# Patient Record
Sex: MALE | Race: OTHER | NOT HISPANIC OR LATINO | URBAN - METROPOLITAN AREA
[De-identification: names, ages, dates, MRNs, and addresses within clinical notes are randomized per-mention and may not be internally consistent; named-entity substitution may affect disease eponyms.]

---

## 2024-02-18 ENCOUNTER — EMERGENCY (EMERGENCY)
Facility: HOSPITAL | Age: 34
LOS: 1 days | Discharge: HOME | End: 2024-02-18
Attending: EMERGENCY MEDICINE | Admitting: EMERGENCY MEDICINE
Payer: SELF-PAY

## 2024-02-18 PROCEDURE — 99283 EMERGENCY DEPT VISIT LOW MDM: CPT

## 2024-02-18 PROCEDURE — 99053 MED SERV 10PM-8AM 24 HR FAC: CPT
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
## 2024-02-18 NOTE — ED PROVIDER NOTE - OBJECTIVE STATEMENT
34-year-old male brought in by [facility] for medical screening exam, patient is uncooperative in triage, does not appear intoxicated.  Tells me the name date and that he wishes Andreas Lopez was the president.  He appears with steady gait, calm coherent speech, is agitated towards the  but not me.  Denies medical problems, no medical complaints.  Refused vital signs.

## 2024-02-18 NOTE — ED ADULT NURSE REASSESSMENT NOTE - NS ED NURSE REASSESS COMMENT FT1
Pt has no medical complaints. Refused vs assessment. Pt assessed by MD and left ed in no distress. Aox4 with steady gait and clear speech.

## 2024-02-18 NOTE — ED PROVIDER NOTE - PATIENT PORTAL LINK FT
You can access the FollowMyHealth Patient Portal offered by [facility] by registering at the following website: [email or web address]. By joining [facility]’s FollowMyHealth portal, you will also be able to view your health information using other applications (apps) compatible with our system.
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
## 2024-02-18 NOTE — ED PROVIDER NOTE - PHYSICAL EXAMINATION
CONSTITUTIONAL: Well-appearing; well-nourished; in no apparent distress.   HEAD: Normocephalic; atraumatic.   EYES:  clear bilaterally  ENT: airway patent  Resp breathing comfortably with no distress  PSYCHOLOGICAL: The patient’s mood and manner are agitated but not hostile or intoxicated
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
## 2024-02-21 DIAGNOSIS — R45.5 HOSTILITY: ICD-10-CM

## 2024-02-21 DIAGNOSIS — R45.1 RESTLESSNESS AND AGITATION: ICD-10-CM
